# Patient Record
Sex: MALE | Race: WHITE | Employment: OTHER | URBAN - NONMETROPOLITAN AREA
[De-identification: names, ages, dates, MRNs, and addresses within clinical notes are randomized per-mention and may not be internally consistent; named-entity substitution may affect disease eponyms.]

---

## 2018-12-12 ENCOUNTER — HOSPITAL ENCOUNTER (OUTPATIENT)
Age: 83
Discharge: HOME | End: 2018-12-12
Payer: MEDICARE

## 2018-12-12 LAB
ABO: NORMAL
ANTIBODY SCREEN: NORMAL
RH FACTOR: NORMAL

## 2018-12-12 PROCEDURE — 86900 BLOOD TYPING SEROLOGIC ABO: CPT

## 2018-12-12 PROCEDURE — 86850 RBC ANTIBODY SCREEN: CPT

## 2018-12-12 PROCEDURE — 36415 COLL VENOUS BLD VENIPUNCTURE: CPT

## 2018-12-12 PROCEDURE — 86901 BLOOD TYPING SEROLOGIC RH(D): CPT

## 2021-06-09 ENCOUNTER — OUTSIDE SERVICES (OUTPATIENT)
Dept: FAMILY MEDICINE CLINIC | Age: 86
End: 2021-06-09
Payer: MEDICARE

## 2021-06-09 VITALS
TEMPERATURE: 98 F | OXYGEN SATURATION: 96 % | HEART RATE: 81 BPM | RESPIRATION RATE: 20 BRPM | DIASTOLIC BLOOD PRESSURE: 70 MMHG | WEIGHT: 204 LBS | SYSTOLIC BLOOD PRESSURE: 134 MMHG

## 2021-06-09 DIAGNOSIS — I10 ESSENTIAL HYPERTENSION: ICD-10-CM

## 2021-06-09 DIAGNOSIS — G47.01 INSOMNIA DUE TO MEDICAL CONDITION: ICD-10-CM
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
## 2021-06-09 DIAGNOSIS — I48.19 PERSISTENT ATRIAL FIBRILLATION (HCC): ICD-10-CM

## 2021-06-09 DIAGNOSIS — S22.42XD CLOSED FRACTURE OF MULTIPLE RIBS OF LEFT SIDE WITH ROUTINE HEALING, SUBSEQUENT ENCOUNTER: Primary | ICD-10-CM

## 2021-06-09 DIAGNOSIS — R09.02 HYPOXIA: ICD-10-CM

## 2021-06-09 PROCEDURE — 99490 CHRNC CARE MGMT STAFF 1ST 20: CPT | Performed by: FAMILY MEDICINE

## 2021-06-09 PROCEDURE — 99306 1ST NF CARE HIGH MDM 50: CPT | Performed by: FAMILY MEDICINE

## 2021-06-09 ASSESSMENT — ENCOUNTER SYMPTOMS
VOMITING: 0
NAUSEA: 0
SHORTNESS OF BREATH: 1
COUGH: 0

## 2021-06-09 NOTE — PROGRESS NOTES
Marilee Lopez is a 80 y.o. male who presents today for his medical conditions/complaints as noted below. Chief Complaint   Patient presents with    Other     Admission to [facility] 6/8/21       HPI:     Margaret Ortezo \"Matt\" Domingo Brenner is an 79 yo male who was seen today for his admission to [facility] (admitted 6/8/21). He was admitted to [facility] from 5/26/21-6/8/21. Hospital records, labs, and imaging were reviewed and are summarized below. On 5/26 he was trimming a tree when he lost his balance and fell. He landed on his left side. He did not have LOC. He had pain in his left shoulder and left chest wall. CT chest revealed mildly displaced rib fractures of 4-8 on the left. He is here for rehab. Today he was seen in his room and was about to get up for rehab. He's having pain in his left ribs and left shoulder, but the medication helps. He states that his bowels are moving. During his hospitalization he was found to be in a tachyarrhythmia so was started on an Amiodarone drip. An echocardiogram was performed which revealed an EF of 45-50%. LV was of normal size. PMH is significant for HTN and a L. hip replacement. Past Medical History:   Diagnosis Date    Essential hypertension       Past Surgical History:   Procedure Laterality Date    TOTAL HIP ARTHROPLASTY Left        No family history on file. Social History     Tobacco Use    Smoking status: Not on file   Substance Use Topics    Alcohol use: Not on file      Not on File    Health Maintenance   Topic Date Due    COVID-19 Vaccine (1) Never done    Flu vaccine (Season Ended) 09/01/2021       Subjective:      Review of Systems   Constitutional: Negative for activity change, appetite change and fever. Respiratory: Positive for shortness of breath. Negative for cough. Gastrointestinal: Negative for nausea and vomiting. Musculoskeletal: Positive for arthralgias and myalgias.    Allergic/Immunologic: exacerbation, decline in function or functional decline. His comprehensive plan was established, implemented, revised or monitored today. I spent 20 minutes reviewing plan. Patient seen and examined. History partially obtained by chart review and nursing notes. I have reviewed patient's past medical, surgical, social, and family history and have made updates where appropriate.   See facility EMR for updated medication list.     Electronically signed by Kasandra Escalante MD on 6/9/2021 at 1:01 PM

## 2021-06-23 ENCOUNTER — OUTSIDE SERVICES (OUTPATIENT)
Dept: FAMILY MEDICINE CLINIC | Age: 86
End: 2021-06-23

## 2021-06-23 VITALS
TEMPERATURE: 97.8 F | OXYGEN SATURATION: 94 % | WEIGHT: 204.6 LBS | HEART RATE: 70 BPM | SYSTOLIC BLOOD PRESSURE: 113 MMHG | DIASTOLIC BLOOD PRESSURE: 70 MMHG | RESPIRATION RATE: 18 BRPM

## 2021-06-23 RX ORDER — TAMSULOSIN HYDROCHLORIDE 0.4 MG/1
0.4 CAPSULE ORAL DAILY
Qty: 30 CAPSULE | Refills: 0 | Status: SHIPPED | OUTPATIENT
Start: 2021-06-23

## 2021-06-23 RX ORDER — GABAPENTIN 100 MG/1
100 CAPSULE ORAL 2 TIMES DAILY
Qty: 60 CAPSULE | Refills: 0 | Status: SHIPPED | OUTPATIENT
Start: 2021-06-23 | End: 2021-12-20

## 2021-06-23 RX ORDER — AMLODIPINE BESYLATE AND BENAZEPRIL HYDROCHLORIDE 5; 10 MG/1; MG/1
2 CAPSULE ORAL DAILY
Qty: 60 CAPSULE | Refills: 0 | Status: SHIPPED | OUTPATIENT
Start: 2021-06-23 | End: 2022-03-07

## 2021-06-23 RX ORDER — METOPROLOL SUCCINATE 25 MG/1
37.5 TABLET, EXTENDED RELEASE ORAL DAILY
Qty: 45 TABLET | Refills: 0 | Status: SHIPPED | OUTPATIENT
Start: 2021-06-23 | End: 2021-09-13

## 2021-06-23 RX ORDER — AMIODARONE HYDROCHLORIDE 200 MG/1
200 TABLET ORAL DAILY
Qty: 30 TABLET | Refills: 0 | Status: SHIPPED | OUTPATIENT
Start: 2021-06-23

## 2021-06-23 ASSESSMENT — ENCOUNTER SYMPTOMS
COUGH: 0
VOMITING: 0
NAUSEA: 0
SHORTNESS OF BREATH: 0

## 2021-06-23 NOTE — PROGRESS NOTES
less than 90 or greater than 108, diastolic BP less than 50 or greater than 100. - Medication compliance and education for  safety concerns  - Consult Physical Therapy for  Evaluate and Treat  - Consult Occupational Therapy for  Evaluate and Treat       Past Medical History:   Diagnosis Date    Essential hypertension       Past Surgical History:   Procedure Laterality Date    TOTAL HIP ARTHROPLASTY Left        No family history on file. Social History     Tobacco Use    Smoking status: Not on file   Substance Use Topics    Alcohol use: Not on file      Not on File    Health Maintenance   Topic Date Due    COVID-19 Vaccine (1) Never done    DTaP/Tdap/Td vaccine (1 - Tdap) Never done    Shingles Vaccine (1 of 2) Never done    Pneumococcal 65+ years Vaccine (1 of 1 - PPSV23) Never done   [employer] Visit (AWV)  Never done    Flu vaccine (Season Ended) 09/01/2021    Hepatitis A vaccine  Aged Out    Hepatitis B vaccine  Aged Out    Hib vaccine  Aged Out    Meningococcal (ACWY) vaccine  Aged Out       Subjective:      Review of Systems   Constitutional: Negative for activity change, appetite change and fever. Respiratory: Negative for cough and shortness of breath. Gastrointestinal: Negative for nausea and vomiting. Musculoskeletal: Positive for arthralgias and myalgias. Allergic/Immunologic: Negative for environmental allergies and food allergies. Neurological: Positive for weakness. Negative for numbness. Objective:     Physical Exam  Vitals reviewed. Constitutional:       Appearance: Normal appearance. HENT:      Head: Normocephalic and atraumatic. Cardiovascular:      Rate and Rhythm: Normal rate. Rhythm irregularly irregular. Pulses: Normal pulses. Pulmonary:      Effort: Pulmonary effort is normal. No respiratory distress. Breath sounds: Normal breath sounds. Chest:      Breasts:         Left: Tenderness present.    Abdominal:      General: Bowel sounds are normal. There is no distension. Palpations: Abdomen is soft. Neurological:      Mental Status: He is alert. /70   Pulse 70   Temp 97.8 °F (36.6 °C)   Resp 18   Wt 204 lb 9.6 oz (92.8 kg)   SpO2 94%     Assessment:      1. Closed fracture of multiple ribs of left side with routine healing, subsequent encounter - Physical and occupational therapy have worked with the patient and he has improved enough to be safely discharged home. He will have home health through [facility]. He will follow up with his PCP Dr. June Elliott on 6/30/21 at 3:00pm.   2. Fall, subsequent encounter - See above. 3. Persistent atrial fibrillation ([address] - He is rate controlled. Continue on Amiodorone 200 mg daily, Amlodipine-Benazepril 5-10 mg 2 capsules daily, and Toprol 37.5 mg daily. Continue on ASA 81 mg daily. He is not a candidate for further anticoagulation given his recent fall. F/u with Dr. Mati Cid on 7/20/21 at 2:00pm.   4. Essential hypertension - Blood pressure is controlled so will continue Amlodipine-Benazepril and Toprol. Monitor BPs.   5. Hypoxia - Continue to monitor. He will f/u with Dr. Azul Villanueva on 7/14/21 at 1:30pm.   6. Insomnia due to medical condition - Continue with Melatonin PRN. Patient seen and examined. History partially obtained by chart review and nursing notes. I have reviewed patient's past medical, surgical, social, and family history and have made updates where appropriate. See facility EMR for updated medication list.     31 minutes were spent in counseling/coordination of care in preparation for discharge.        Electronically signed by Sumit Fregoso MD on 6/23/2021 at 12:01 PM

## 2021-09-13 RX ORDER — METOPROLOL SUCCINATE 25 MG/1
TABLET, EXTENDED RELEASE ORAL
Qty: 45 TABLET | Refills: 0 | Status: SHIPPED | OUTPATIENT
Start: 2021-09-13

## 2022-03-07 RX ORDER — AMLODIPINE BESYLATE AND BENAZEPRIL HYDROCHLORIDE 5; 10 MG/1; MG/1
CAPSULE ORAL
Qty: 60 CAPSULE | Refills: 0 | Status: SHIPPED | OUTPATIENT
Start: 2022-03-07

## 2023-09-26 VITALS
HEART RATE: 69 BPM | OXYGEN SATURATION: 94 % | WEIGHT: 194 LBS | TEMPERATURE: 97.6 F | RESPIRATION RATE: 18 BRPM | SYSTOLIC BLOOD PRESSURE: 148 MMHG | DIASTOLIC BLOOD PRESSURE: 70 MMHG

## 2023-09-26 ASSESSMENT — ENCOUNTER SYMPTOMS
EYE REDNESS: 0
COUGH: 0
SHORTNESS OF BREATH: 0
DIARRHEA: 0
VOMITING: 0
NAUSEA: 0
CONSTIPATION: 0
RHINORRHEA: 0
WHEEZING: 0
SORE THROAT: 0

## 2023-09-26 NOTE — PROGRESS NOTES
Evelyn Sampson is a 80 y.o. male who presents today for his medical conditions/complaints as noted below. Chief Complaint   Patient presents with    Admission to [address] [place] [address] 9/21/23       HPI:     Oneida Hauser \"Matt\" Simone Renteria is a 81 yo male who was seen today for his admission to [address] [place] [address] (admitted 9/21/23). He started with diarrhea, decreased PO intake, and weakness on 9/17 and was admitted to [facility]. EKG showed sinus bradycardia. He does have a h/o afib on Eliquis. He also has Parkinson's disease, JULIETTE on CPAP, HTN, and BPH. He did have a pressure injury of stage II on admission to the hospital and this continues. He denies any pain in this area. He has been on oxygen and denies the prior need. He states that he's not coughing and is not SOB. No fevers or chills. Past Medical History:   Diagnosis Date    Atrial fibrillation (HCC)     BPH (benign prostatic hyperplasia)     Essential hypertension     JULIETTE (obstructive sleep apnea)     Parkinson's disease (HCC)       Past Surgical History:   Procedure Laterality Date    TOTAL HIP ARTHROPLASTY Left        Family History   Problem Relation Age of Onset    Heart Disease Mother     Cancer Mother        Social History     Tobacco Use    Smoking status: Former     Types: Cigarettes    Smokeless tobacco: Never   Substance Use Topics    Alcohol use: Never      No Known Allergies    Health Maintenance   Topic Date Due    Depression Screen  Never done    Shingles vaccine (1 of 2) Never done    COVID-19 Vaccine (4 - Pfizer series) 10/10/2022    Flu vaccine (1) 08/01/2023    DTaP/Tdap/Td vaccine (2 - Td or Tdap) 03/21/2028    Pneumococcal 65+ years Vaccine  Completed    Hepatitis A vaccine  Aged Out    Hepatitis B vaccine  Aged Out    Hib vaccine  Aged Out    Meningococcal (ACWY) vaccine  Aged Out       Subjective:      Review of Systems   Constitutional:  Negative for chills, fever and unexpected weight change. HENT:  Positive for hearing loss.

## 2023-09-27 ENCOUNTER — OUTSIDE SERVICES (OUTPATIENT)
Dept: FAMILY MEDICINE CLINIC | Age: 88
End: 2023-09-27

## 2023-09-27 DIAGNOSIS — G47.33 OSA (OBSTRUCTIVE SLEEP APNEA): ICD-10-CM

## 2023-09-27 DIAGNOSIS — F32.0 CURRENT MILD EPISODE OF MAJOR DEPRESSIVE DISORDER WITHOUT PRIOR EPISODE (HCC): ICD-10-CM

## 2023-09-27 DIAGNOSIS — R29.6 REPEATED FALLS: Primary | ICD-10-CM

## 2023-09-27 DIAGNOSIS — I48.0 PAROXYSMAL ATRIAL FIBRILLATION (HCC): ICD-10-CM

## 2023-09-27 DIAGNOSIS — G20.A1 PARKINSON'S DISEASE: ICD-10-CM

## 2023-09-27 DIAGNOSIS — N40.0 BENIGN PROSTATIC HYPERPLASIA, UNSPECIFIED WHETHER LOWER URINARY TRACT SYMPTOMS PRESENT: ICD-10-CM

## 2023-09-27 DIAGNOSIS — I10 ESSENTIAL HYPERTENSION: ICD-10-CM
